# Patient Record
Sex: MALE | Race: WHITE | URBAN - METROPOLITAN AREA
[De-identification: names, ages, dates, MRNs, and addresses within clinical notes are randomized per-mention and may not be internally consistent; named-entity substitution may affect disease eponyms.]

---

## 2017-10-30 ENCOUNTER — APPOINTMENT (RX ONLY)
Dept: URBAN - METROPOLITAN AREA CLINIC 22 | Facility: CLINIC | Age: 52
Setting detail: DERMATOLOGY
End: 2017-10-30

## 2017-10-30 DIAGNOSIS — L92.3 FOREIGN BODY GRANULOMA OF THE SKIN AND SUBCUTANEOUS TISSUE: ICD-10-CM

## 2017-10-30 PROBLEM — S51.849S: Status: ACTIVE | Noted: 2017-10-30

## 2017-10-30 PROCEDURE — 99201: CPT | Mod: 57

## 2017-10-30 PROCEDURE — 26320 REMOVAL OF IMPLANT FROM HAND: CPT | Mod: 52

## 2017-10-30 PROCEDURE — ?

## 2017-10-30 PROCEDURE — ? CPT BILLER

## 2017-10-30 PROCEDURE — ? OTHER

## 2017-10-30 ASSESSMENT — LOCATION ZONE DERM: LOCATION ZONE: ARM

## 2017-10-30 ASSESSMENT — LOCATION SIMPLE DESCRIPTION DERM: LOCATION SIMPLE: LEFT FOREARM

## 2017-10-30 ASSESSMENT — LOCATION DETAILED DESCRIPTION DERM: LOCATION DETAILED: LEFT VENTRAL PROXIMAL FOREARM

## 2017-10-30 NOTE — PROCEDURE: FOREIGN BODY REMOVAL (LOCAL)
Detail Level: Detailed
Consent: Informed consent was obtained and the patient verbalized full understanding. The risks, benefits, expectations and alternatives were reviewed in detail, including, but not limited to, the risks of delayed wound healing, infection, need for multiple incisions and drainages, recurrence, bleeding, injury to underlying structures (including nerves, devices, or organs), need for additional procedures, and pain.
Estimated Blood Loss (Cc): scant
Surgeon: Abel Paz M.D.
Anesthesia Type: 1% lidocaine with epinephrine
Anesthesia Volume In Cc: 7